# Patient Record
Sex: FEMALE | Race: WHITE | NOT HISPANIC OR LATINO | Employment: OTHER | URBAN - METROPOLITAN AREA
[De-identification: names, ages, dates, MRNs, and addresses within clinical notes are randomized per-mention and may not be internally consistent; named-entity substitution may affect disease eponyms.]

---

## 2018-06-21 LAB
ABSOL LYMPHOCYTES (HISTORICAL): 1.8 K/UL (ref 0.5–4)
ALBUMIN SERPL BCP-MCNC: 3.7 G/DL (ref 3–5.2)
ALP SERPL-CCNC: 142 U/L (ref 43–122)
ALT SERPL W P-5'-P-CCNC: 19 U/L (ref 9–52)
ANION GAP SERPL CALCULATED.3IONS-SCNC: 11 MMOL/L (ref 5–14)
AST SERPL W P-5'-P-CCNC: 26 U/L (ref 14–36)
BASOPHILS # BLD AUTO: 0 % (ref 0–1)
BASOPHILS # BLD AUTO: 0 K/UL (ref 0–0.1)
BILIRUB SERPL-MCNC: 0.3 MG/DL
BUN SERPL-MCNC: 22 MG/DL (ref 5–25)
CALCIUM SERPL-MCNC: 9.4 MG/DL (ref 8.4–10.2)
CHLORIDE SERPL-SCNC: 103 MEQ/L (ref 97–108)
CO2 SERPL-SCNC: 28 MMOL/L (ref 22–30)
CREATINE, SERUM (HISTORICAL): 0.56 MG/DL (ref 0.6–1.2)
DEPRECATED RDW RBC AUTO: 15.3 %
EGFR (HISTORICAL): >60 ML/MIN/1.73 M2
EOSINOPHIL # BLD AUTO: 0.1 K/UL (ref 0–0.4)
EOSINOPHIL NFR BLD AUTO: 2 % (ref 0–6)
GLUCOSE SERPL-MCNC: 120 MG/DL (ref 70–99)
HCT VFR BLD AUTO: 40.2 % (ref 36–46)
HGB BLD-MCNC: 13 G/DL (ref 12–16)
LYMPHOCYTES NFR BLD AUTO: 22 % (ref 25–45)
MCH RBC QN AUTO: 30.7 PG (ref 26–34)
MCHC RBC AUTO-ENTMCNC: 32.4 % (ref 31–36)
MCV RBC AUTO: 95 FL (ref 80–100)
MONOCYTES # BLD AUTO: 0.8 K/UL (ref 0.2–0.9)
MONOCYTES NFR BLD AUTO: 9 % (ref 1–10)
NEUTROPHILS ABS COUNT (HISTORICAL): 5.5 K/UL (ref 1.8–7.8)
NEUTS SEG NFR BLD AUTO: 67 % (ref 45–65)
PLATELET # BLD AUTO: 327 K/MCL (ref 150–450)
POTASSIUM SERPL-SCNC: 4.8 MEQ/L (ref 3.6–5)
RBC # BLD AUTO: 4.25 M/MCL (ref 4–5.2)
SODIUM SERPL-SCNC: 142 MEQ/L (ref 137–147)
TOTAL PROTEIN (HISTORICAL): 7.2 G/DL (ref 5.9–8.4)
WBC # BLD AUTO: 8.3 K/MCL (ref 4.5–11)

## 2018-08-05 DIAGNOSIS — I10 ESSENTIAL HYPERTENSION: Primary | ICD-10-CM

## 2018-08-05 RX ORDER — AMLODIPINE BESYLATE 5 MG/1
TABLET ORAL
Qty: 90 TABLET | Refills: 3 | Status: SHIPPED | OUTPATIENT
Start: 2018-08-05 | End: 2019-01-23 | Stop reason: SDUPTHER

## 2018-08-24 ENCOUNTER — HOSPITAL ENCOUNTER (EMERGENCY)
Facility: HOSPITAL | Age: 83
Discharge: HOME | End: 2018-08-24
Attending: EMERGENCY MEDICINE | Admitting: EMERGENCY MEDICINE
Payer: MEDICARE

## 2018-08-24 ENCOUNTER — APPOINTMENT (EMERGENCY)
Dept: RADIOLOGY | Facility: HOSPITAL | Age: 83
End: 2018-08-24
Payer: MEDICARE

## 2018-08-24 VITALS
HEART RATE: 90 BPM | SYSTOLIC BLOOD PRESSURE: 124 MMHG | TEMPERATURE: 98.2 F | DIASTOLIC BLOOD PRESSURE: 58 MMHG | BODY MASS INDEX: 22.98 KG/M2 | RESPIRATION RATE: 18 BRPM | WEIGHT: 114 LBS | HEIGHT: 59 IN | OXYGEN SATURATION: 98 %

## 2018-08-24 DIAGNOSIS — S01.01XA SCALP LACERATION, INITIAL ENCOUNTER: ICD-10-CM

## 2018-08-24 DIAGNOSIS — S09.90XA MINOR HEAD INJURY WITHOUT LOSS OF CONSCIOUSNESS, INITIAL ENCOUNTER: Primary | ICD-10-CM

## 2018-08-24 PROCEDURE — 72125 CT NECK SPINE W/O DYE: CPT

## 2018-08-24 PROCEDURE — 70450 CT HEAD/BRAIN W/O DYE: CPT

## 2018-08-24 PROCEDURE — 99284 EMERGENCY DEPT VISIT MOD MDM: CPT

## 2018-08-24 RX ORDER — ASPIRIN 81 MG/1
81 TABLET, CHEWABLE ORAL DAILY
COMMUNITY
End: 2019-01-23 | Stop reason: SDUPTHER

## 2018-08-24 NOTE — ED ATTENDING ATTESTATION
Brittany Weir DO, saw and evaluated the patient  I have discussed the patient with the resident/non-physician practitioner and agree with the resident's/non-physician practitioner's findings, Plan of Care, and MDM as documented in the resident's/non-physician practitioner's note, except where noted  All available labs and Radiology studies were reviewed  At this point I agree with the current assessment done in the Emergency Department  I have conducted an independent evaluation of this patient a history and physical is as follows:    80 yof on asa with mechanical fall  Reaching for clothing when she fell onto posterior head  No pain  No LOC  No a/e factors  Feels well now  Small laceration to posterior scalp  A/P: mechanical fall  Ct head/necl  Laceration repair      Critical Care Time  CritCare Time    Procedures
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
## 2018-08-24 NOTE — DISCHARGE INSTRUCTIONS
To not scrub the head for 24 hours after staple application  Take Tylenol as needed for pain  Return to your primary care provider or the emergency department in 7 days to get the staples removed  Return to the emergency department for the following, but not limited to change in mental status, changes in vision, headache, neck pain, fevers, chills, pus or drainage from the wound site, redness surrounding the area  Head Injury   WHAT YOU NEED TO KNOW:   A head injury is most often caused by a blow to the head  This may occur from a fall, bicycle injury, sports injury, being struck in the head, or a motor vehicle accident  DISCHARGE INSTRUCTIONS:   Call 911 or have someone else call for any of the following:   · You cannot be woken  · You have a seizure  · You stop responding to others or you faint  · You have blurry or double vision  · Your speech becomes slurred or confused  · You have arm or leg weakness, loss of feeling, or new problems with coordination  · Your pupils are larger than usual or one pupil is a different size than the other  · You have blood or clear fluid coming out of your ears or nose  Return to the emergency department if:   · You have repeated or forceful vomiting  · You feel confused  · Your headache gets worse or becomes severe  · You or someone caring for you notices that you are harder to wake than usual   Contact your healthcare provider if:   · Your symptoms last longer than 6 weeks after the injury  · You have questions or concerns about your condition or care  Medicines:   · Acetaminophen  decreases pain  Acetaminophen is available without a doctor's order  Ask how much to take and how often to take it  Follow directions  Acetaminophen can cause liver damage if not taken correctly  · Take your medicine as directed  Contact your healthcare provider if you think your medicine is not helping or if you have side effects   Tell him or her if you are allergic to any medicine  Keep a list of the medicines, vitamins, and herbs you take  Include the amounts, and when and why you take them  Bring the list or the pill bottles to follow-up visits  Carry your medicine list with you in case of an emergency  Self-care:   · Rest  or do quiet activities for 24 to 48 hours  Limit your time watching TV, using the computer, or doing tasks that require a lot of thinking  Slowly return to your normal activities as directed  Do not play sports or do activities that may cause you to get hit in the head  Ask your healthcare provider when you can return to sports  · Apply ice  on your head for 15 to 20 minutes every hour or as directed  Use an ice pack, or put crushed ice in a plastic bag  Cover it with a towel before you apply it to your skin  Ice helps prevent tissue damage and decreases swelling and pain  · Have someone stay  ·   ·  with you for 24 hours  or as directed  This person can monitor you for complications and call [phone]  When you are awake the person should ask you a few questions to see if you are thinking clearly  An example would be to ask your name or your address  Prevent another head injury:   · Wear a helmet that fits properly  Do this when you play sports, or ride a bike, scooter, or skateboard  Helmets help decrease your risk of a serious head injury  Talk to your healthcare provider about other ways you can protect yourself if you play sports  · Wear your seat belt every time you are in a car  This helps to decrease your risk for a head injury if you are in a car accident  Follow up with your healthcare provider as directed:  Write down your questions so you remember to ask them during your visits  © 2017 [address] Information is for End User's use only and may not be sold, redistributed or otherwise used for commercial purposes   All illustrations and images included in CareNotes® are the copyrighted property of [employer]  or William Zambrano  The above information is an  only  It is not intended as medical advice for individual conditions or treatments  Talk to your doctor, nurse or pharmacist before following any medical regimen to see if it is safe and effective for you  Laceration   WHAT YOU NEED TO KNOW:   A laceration is an injury to the skin and the soft tissue underneath it  Lacerations happen when you are cut or hit by something  They can happen anywhere on the body  DISCHARGE INSTRUCTIONS:   Return to the emergency department if:   · You have heavy bleeding or bleeding that does not stop after 10 minutes of holding firm, direct pressure over the wound  · Your wound opens up  Contact your healthcare provider if:   · You have a fever or chills  · Your laceration is red, warm, or swollen  · You have red streaks on your skin coming from your wound  · You have white or yellow drainage from the wound that smells bad  · You have pain that gets worse, even after treatment  · You have questions or concerns about your condition or care  Medicines:   · Prescription pain medicine  may be given  Ask how to take this medicine safely  · Antibiotics  help treat or prevent a bacterial infection  · Take your medicine as directed  Contact your healthcare provider if you think your medicine is not helping or if you have side effects  Tell him or her if you are allergic to any medicine  Keep a list of the medicines, vitamins, and herbs you take  Include the amounts, and when and why you take them  Bring the list or the pill bottles to follow-up visits  Carry your medicine list with you in case of an emergency  Care for your wound as directed:   · Do not get your wound wet  until your healthcare provider says it is okay  Do not soak your wound in water  Do not go swimming until your healthcare provider says it is okay  Carefully wash the wound with soap and water  Gently pat the area dry or allow it to air dry  · Change your bandages  when they get wet, dirty, or after washing  Apply new, clean bandages as directed  Do not apply elastic bandages or tape too tight  Do not put powders or lotions over your incision  · Apply antibiotic ointment as directed  Your healthcare provider may give you antibiotic ointment to put over your wound if you have stitches  If you have strips of tape over your incision, let them dry up and fall off on their own  If they do not fall off within 14 days, gently remove them  If you have glue over your wound, do not remove or pick at it  If your glue comes off, do not replace it with glue that you have at home  · Check your wound every day for signs of infection such as swelling, redness, or pus  Self-care:   · Apply ice  on your wound for 15 to 20 minutes every hour or as directed  Use an ice pack, or put crushed ice in a plastic bag  Cover it with a towel  Ice helps prevent tissue damage and decreases swelling and pain  · Use a splint as directed  A splint will decrease movement and stress on your wound  It may help it heal faster  A splint may be used for lacerations over joints or areas of your body that bend  Ask your healthcare provider how to apply and remove a splint  · Decrease scarring of your wound  by applying ointments as directed  Do not apply ointments until your healthcare provider says it is okay  You may need to wait until your wound is healed  Ask which ointment to buy and how often to use it  After your wound is healed, use sunscreen over the area when you are out in the sun  You should do this for at least 6 months to 1 year after your injury  Follow up with your healthcare provider as directed: You may need to follow up in 24 to 48 hours to have your wound checked for infection  You will need to return in 3 to 14 days if you have stitches or staples so they can be removed   Care for your wound as directed to prevent infection and help it heal  Write down your questions so you remember to ask them during your visits  © 2017 [address] Information is for End User's use only and may not be sold, redistributed or otherwise used for commercial purposes  All illustrations and images included in CareNotes® are the copyrighted property of A D A M , Inc  or William Zambrano  The above information is an  only  It is not intended as medical advice for individual conditions or treatments  Talk to your doctor, nurse or pharmacist before following any medical regimen to see if it is safe and effective for you

## 2018-08-30 RX ORDER — ACETAMINOPHEN 500 MG
TABLET ORAL EVERY 6 HOURS
COMMUNITY
End: 2019-03-27 | Stop reason: SDUPTHER

## 2018-08-30 RX ORDER — AMLODIPINE BESYLATE 5 MG/1
TABLET ORAL EVERY 24 HOURS
COMMUNITY
Start: 2017-07-27 | End: 2019-03-05

## 2018-08-31 ENCOUNTER — OFFICE VISIT (OUTPATIENT)
Dept: FAMILY MEDICINE CLINIC | Facility: CLINIC | Age: 83
End: 2018-08-31
Payer: MEDICARE

## 2018-08-31 VITALS — RESPIRATION RATE: 22 BRPM | SYSTOLIC BLOOD PRESSURE: 130 MMHG | DIASTOLIC BLOOD PRESSURE: 80 MMHG | HEART RATE: 80 BPM

## 2018-08-31 DIAGNOSIS — Z48.02 REMOVAL OF STAPLE: Primary | ICD-10-CM

## 2018-08-31 DIAGNOSIS — R05.9 COUGH: ICD-10-CM

## 2018-08-31 PROBLEM — S32.599A PUBIC RAMUS FRACTURE (HCC): Status: ACTIVE | Noted: 2018-03-20

## 2018-08-31 PROCEDURE — 99214 OFFICE O/P EST MOD 30 MIN: CPT | Performed by: NURSE PRACTITIONER

## 2018-08-31 NOTE — PROGRESS NOTES
Assessment/Plan:    No problem-specific Assessment & Plan notes found for this encounter  Diagnoses and all orders for this visit:    Removal of staple    Cough    Other orders  -     acetaminophen (TYLENOL) 500 mg tablet; every 6 (six) hours  -     amLODIPine (NORVASC) 5 mg tablet; every 24 hours          Subjective:      Patient ID: Cande Alvarez is a 80 y o  female  HPI    Renata Ludivina is here today for staple removal   She fell a week ago  She does have a laceration which was repaired at the ER  She has 6 staples in place on the back of her head  She is having some balance issues  She was in PT but had to be stopped due to insurance reasons  But now at this point she may be able to resume  On her gait is unsteady she does use a walker  She also has developed a slight cough and some chest congestion  No fevers or chills  Eating normal     Daughter questioning if taking the amilodipine at night, supper time, I see no problem with that  The following portions of the patient's history were reviewed and updated as appropriate: allergies, current medications, past family history, past medical history, past social history, past surgical history and problem list     Review of Systems   Constitutional: Negative for activity change, appetite change, fatigue and fever  HENT: Negative for congestion, mouth sores, nosebleeds, sneezing and sore throat  Respiratory: Positive for cough  Negative for shortness of breath and wheezing  Cardiovascular: Negative for chest pain  Skin: Positive for wound  Objective:  Vitals:    08/31/18 1002   BP: 130/80   BP Location: Left arm   Patient Position: Sitting   Cuff Size: Standard   Pulse: 80   Resp: 22      Physical Exam   Constitutional: She appears well-developed and well-nourished  Pulmonary/Chest: Effort normal and breath sounds normal  She has no wheezes  She has no rales  She exhibits no tenderness     Skin:

## 2018-12-27 ENCOUNTER — TELEPHONE (OUTPATIENT)
Dept: FAMILY MEDICINE CLINIC | Facility: CLINIC | Age: 83
End: 2018-12-27

## 2018-12-27 NOTE — TELEPHONE ENCOUNTER
Patient daughter called she said to cancel her appointment on 12/28/18 at 2 pm with dr Patricia Long asked if she would like to reschedule another appointment at this time she said that she will call at a later time to make another appointment

## 2019-01-23 ENCOUNTER — TELEPHONE (OUTPATIENT)
Dept: FAMILY MEDICINE CLINIC | Facility: CLINIC | Age: 84
End: 2019-01-23

## 2019-01-23 DIAGNOSIS — I10 ESSENTIAL HYPERTENSION: ICD-10-CM

## 2019-01-23 RX ORDER — LEVETIRACETAM 100 MG/ML
10 SOLUTION ORAL 2 TIMES DAILY
Qty: 473 ML | Refills: 1 | Status: SHIPPED | OUTPATIENT
Start: 2019-01-23 | End: 2019-06-05 | Stop reason: SDUPTHER

## 2019-01-23 RX ORDER — AMLODIPINE BESYLATE 5 MG/1
5 TABLET ORAL DAILY
Qty: 90 TABLET | Refills: 3 | Status: SHIPPED | OUTPATIENT
Start: 2019-01-23 | End: 2020-01-03

## 2019-01-23 RX ORDER — CLOTRIMAZOLE 10 MG/1
10 LOZENGE ORAL; TOPICAL
Qty: 70 TABLET | Refills: 0 | Status: SHIPPED | OUTPATIENT
Start: 2019-01-23 | End: 2019-04-08

## 2019-01-23 RX ORDER — ASPIRIN 81 MG/1
81 TABLET, CHEWABLE ORAL DAILY
Qty: 90 TABLET | Refills: 3 | Status: CANCELLED | OUTPATIENT
Start: 2019-01-23

## 2019-01-23 RX ORDER — CLOTRIMAZOLE 10 MG/1
10 LOZENGE ORAL; TOPICAL 3 TIMES DAILY
Qty: 90 TABLET | Refills: 3 | Status: CANCELLED | OUTPATIENT
Start: 2019-01-23

## 2019-01-23 RX ORDER — ASPIRIN 81 MG/1
81 TABLET, CHEWABLE ORAL DAILY
Qty: 100 TABLET | Refills: 3 | Status: SHIPPED | OUTPATIENT
Start: 2019-01-23

## 2019-01-23 RX ORDER — POLYETHYLENE GLYCOL 3350 17 G/17G
17 POWDER, FOR SOLUTION ORAL DAILY
Qty: 527 G | Refills: 1 | Status: CANCELLED | OUTPATIENT
Start: 2019-01-23

## 2019-01-23 RX ORDER — INFLUENZA A VIRUSA/MICHIGAN/45/2015 X-275 (H1N1) ANTIGEN (FORMALDEHYDE INACTIVATED), INFLUENZA A VIRUS A/HONG KONG/4801/2014 X-263B (H3N2) ANTIGEN (FORMALDEHYDE INACTIVATED), AND INFLUENZA B VIRUS B/BRISBANE/60/2008 ANTIGEN (FORMALDEHYDE INACTIVATED) 60; 60; 60 UG/.5ML; UG/.5ML; UG/.5ML
INJECTION, SUSPENSION INTRAMUSCULAR
Refills: 0 | COMMUNITY
Start: 2018-10-24 | End: 2019-04-08

## 2019-01-23 RX ORDER — ELECTROLYTES/DEXTROSE
1 SOLUTION, ORAL ORAL EVERY 24 HOURS
Qty: 100 TABLET | Refills: 3 | Status: SHIPPED | OUTPATIENT
Start: 2019-01-23

## 2019-01-23 RX ORDER — LEVETIRACETAM 250 MG/1
250 TABLET ORAL 2 TIMES DAILY
Qty: 90 TABLET | Refills: 3 | Status: CANCELLED | OUTPATIENT
Start: 2019-01-23

## 2019-01-23 RX ORDER — ASCORBIC ACID 500 MG
500 TABLET ORAL DAILY
Qty: 90 TABLET | Refills: 3 | Status: CANCELLED | OUTPATIENT
Start: 2019-01-23

## 2019-01-23 RX ORDER — AMLODIPINE BESYLATE 5 MG/1
10 TABLET ORAL DAILY
Qty: 90 TABLET | Refills: 3 | Status: CANCELLED | OUTPATIENT
Start: 2019-01-23

## 2019-01-23 NOTE — TELEPHONE ENCOUNTER
Can you please refill patient medication she is in a new nursing home they also need some dx code thank you

## 2019-01-23 NOTE — TELEPHONE ENCOUNTER
Yogesh Mike from [facility] called pt needs RX for Amlodipine 10 mg and RX for ASA 81 mg and RX for Clotrimazole 10 mg  And RX for Vitamin C 500 mg and Rx for  Levetiraeetam ER 25 mg and Rx for Miralax powder  Called into [facility] at [address]

## 2019-01-23 NOTE — TELEPHONE ENCOUNTER
Faxed received regarding the following medications that need to be refilled  Pt is new at the facility and facility needs the meds  These medications arent even on med list okay to fill? no fever and no chills.

## 2019-01-24 ENCOUNTER — TELEPHONE (OUTPATIENT)
Dept: FAMILY MEDICINE CLINIC | Facility: CLINIC | Age: 84
End: 2019-01-24

## 2019-01-24 DIAGNOSIS — I10 ESSENTIAL HYPERTENSION: ICD-10-CM

## 2019-01-24 RX ORDER — ASCORBIC ACID 500 MG
500 TABLET ORAL DAILY
Qty: 90 TABLET | Refills: 3 | Status: CANCELLED | OUTPATIENT
Start: 2019-01-24

## 2019-01-24 RX ORDER — ASPIRIN 81 MG/1
81 TABLET, CHEWABLE ORAL DAILY
Qty: 100 TABLET | Refills: 3 | Status: CANCELLED | OUTPATIENT
Start: 2019-01-24

## 2019-01-24 RX ORDER — LEVETIRACETAM 100 MG/ML
10 SOLUTION ORAL 2 TIMES DAILY
Qty: 473 ML | Refills: 1 | Status: CANCELLED | OUTPATIENT
Start: 2019-01-24

## 2019-01-24 RX ORDER — CLOTRIMAZOLE 10 MG/1
10 LOZENGE ORAL; TOPICAL
Qty: 70 TABLET | Refills: 0 | Status: CANCELLED | OUTPATIENT
Start: 2019-01-24

## 2019-01-24 RX ORDER — AMLODIPINE BESYLATE 5 MG/1
5 TABLET ORAL DAILY
Qty: 90 TABLET | Refills: 3 | Status: CANCELLED | OUTPATIENT
Start: 2019-01-24

## 2019-01-24 NOTE — TELEPHONE ENCOUNTER
Patient daughter called she said to cancel the appointment on 2/7/19 at 130 pm with dr Bushra Stoll that she will be staying at [facility] and will be going to [facility] to be seen since it will be closer for them so the appointment was cancelled per daughter request

## 2019-01-25 ENCOUNTER — TELEPHONE (OUTPATIENT)
Dept: FAMILY MEDICINE CLINIC | Facility: CLINIC | Age: 84
End: 2019-01-25

## 2019-01-25 NOTE — TELEPHONE ENCOUNTER
I called daughter above message below  Daughter states she is confuse as to why is patient still on the medication  Clotrimazole 10 mg? She also wants to know why is it 5 times a day?  Please advise

## 2019-01-25 NOTE — TELEPHONE ENCOUNTER
Patient daughter Lisa Rodas called she is questioning the Rx for Clotrimazole 10 mg that a Rx was called in for this and it says that she is supposed to take 1 tablet for 5 x a day daughter wants to know why Lisa Rodas can be reached at [phone]   TY

## 2019-01-28 NOTE — TELEPHONE ENCOUNTER
Daughter Anjali Licea concerned because pt has been using the mouth wash for 3 1/2 weeks and now on the medication   Has concerns about the length of time

## 2019-02-06 ENCOUNTER — TELEPHONE (OUTPATIENT)
Dept: FAMILY MEDICINE CLINIC | Facility: CLINIC | Age: 84
End: 2019-02-06
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
## 2019-02-25 ENCOUNTER — TELEPHONE (OUTPATIENT)
Dept: FAMILY MEDICINE CLINIC | Facility: CLINIC | Age: 84
End: 2019-02-25

## 2019-02-25 NOTE — TELEPHONE ENCOUNTER
dain from [facility] called she would a order to get  A stool sample to rule out C-diff on patient Alfredo Rizzo said that she is having diarrhea lose stools and has a very bad smell  Fax to [phone] # [phone]  Pt of dr Troy Slaughter

## 2019-03-01 RX ORDER — DOCUSATE SODIUM 100 MG/1
100 CAPSULE, LIQUID FILLED ORAL
COMMUNITY

## 2019-03-05 ENCOUNTER — OFFICE VISIT (OUTPATIENT)
Dept: FAMILY MEDICINE CLINIC | Facility: CLINIC | Age: 84
End: 2019-03-05
Payer: MEDICARE

## 2019-03-05 VITALS
OXYGEN SATURATION: 95 % | HEART RATE: 74 BPM | RESPIRATION RATE: 16 BRPM | BODY MASS INDEX: 23.79 KG/M2 | HEIGHT: 59 IN | WEIGHT: 118 LBS | SYSTOLIC BLOOD PRESSURE: 126 MMHG | TEMPERATURE: 98.6 F | DIASTOLIC BLOOD PRESSURE: 70 MMHG

## 2019-03-05 DIAGNOSIS — R26.2 AMBULATORY DYSFUNCTION: ICD-10-CM

## 2019-03-05 DIAGNOSIS — H61.22 IMPACTED CERUMEN OF LEFT EAR: ICD-10-CM

## 2019-03-05 DIAGNOSIS — R56.9 SEIZURE (HCC): ICD-10-CM

## 2019-03-05 DIAGNOSIS — R13.10 SWALLOWING DYSFUNCTION: ICD-10-CM

## 2019-03-05 DIAGNOSIS — C18.9 MALIGNANT NEOPLASM OF COLON, UNSPECIFIED PART OF COLON (HCC): ICD-10-CM

## 2019-03-05 DIAGNOSIS — I10 ESSENTIAL HYPERTENSION: ICD-10-CM

## 2019-03-05 DIAGNOSIS — I67.82 TEMPORARY CEREBRAL VASCULAR DYSFUNCTION: Primary | ICD-10-CM

## 2019-03-05 PROBLEM — I16.1 HYPERTENSIVE EMERGENCY: Status: ACTIVE | Noted: 2018-12-09

## 2019-03-05 PROCEDURE — 99214 OFFICE O/P EST MOD 30 MIN: CPT | Performed by: FAMILY MEDICINE

## 2019-03-05 PROCEDURE — 69210 REMOVE IMPACTED EAR WAX UNI: CPT | Performed by: FAMILY MEDICINE

## 2019-03-05 RX ORDER — BISACODYL 10 MG
10 SUPPOSITORY, RECTAL RECTAL DAILY PRN
COMMUNITY

## 2019-03-05 RX ORDER — ASCORBIC ACID 500 MG
500 TABLET ORAL DAILY
COMMUNITY
End: 2020-01-03

## 2019-03-05 RX ORDER — SENNA PLUS 8.6 MG/1
1 TABLET ORAL DAILY PRN
COMMUNITY

## 2019-03-05 NOTE — PROGRESS NOTES
Assessment/Plan:     Diagnoses and all orders for this visit:    Temporary cerebral vascular dysfunction  Comments:  Symptoms improved  Continue to follow up with neurologist     Essential hypertension  Comments:  Stable  Continue same  Orders:  -     CBC and differential; Future  -     Comprehensive metabolic panel; Future  -     TSH, 3rd generation with Free T4 reflex; Future    Seizure ([facility] )  Comments:  Stable on meds  She is to continue to follow up with neurologist     Ambulatory dysfunction  Comments: It was discussed about fall risk  Continue to use walker  Discussed about physical therapy  Impacted cerumen of left ear  Comments:  Removed using curette    Malignant neoplasm of colon, unspecified part of colon ([facility] )  Comments: We will continue to monitor    Swallowing dysfunction  Comments:  Continue with pureed food    Other orders  -     docusate sodium (COLACE) 100 mg capsule; Take 100 mg by mouth  -     POLYETHYLENE GLYCOL 3350 PO; Take 1 g by mouth daily  -     Magnesium Hydroxide (MILK OF MAGNESIA PO); Take 30 mL by mouth daily as needed  -     ascorbic acid (VITAMIN C) 500 mg tablet; Take 500 mg by mouth daily  -     bisacodyl (BISCOLAX) 10 mg suppository; Insert 10 mg into the rectum daily as needed for constipation  -     senna (SENOKOT) 8 6 MG tablet; Take 1 tablet by mouth daily as needed for constipation  -     Starch-Maltodextrin (THICK-IT PO); Take by mouth  -     Sodium Phosphates (ENEMA READY-TO-USE RE); Insert into the rectum daily as needed  -     Ear cerumen removal          There are no Patient Instructions on file for this visit  Return in about 1 month (around 4/5/2019)  Subjective:      Patient ID: Lehman Fothergill is a 80 y o  female  Chief Complaint   Patient presents with    Hypertension       She is here today as a new patient to establish and for multiple medical problems  She was hospitalized recently status post CVA episode  Her symptoms improved    She continues to have problems with swallowing  She has been able to eat pureed food and adding Thick-it to it  The following portions of the patient's history were reviewed and updated as appropriate: allergies, current medications, past family history, past medical history, past social history, past surgical history and problem list     Review of Systems   Constitutional: Negative for chills and fever  HENT: Negative for trouble swallowing  Eyes: Negative for visual disturbance  Respiratory: Negative for cough and shortness of breath  Cardiovascular: Negative for chest pain, palpitations and leg swelling  Gastrointestinal: Negative for abdominal pain, constipation and diarrhea  Endocrine: Negative for cold intolerance and heat intolerance  Genitourinary: Negative for difficulty urinating and dysuria  Musculoskeletal: Negative for gait problem  Skin: Negative for rash  Neurological: Positive for weakness  Negative for dizziness, tremors, seizures and headaches  Hematological: Negative for adenopathy  Psychiatric/Behavioral: Negative for behavioral problems           Current Outpatient Medications   Medication Sig Dispense Refill    amLODIPine (NORVASC) 5 mg tablet Take 1 tablet (5 mg total) by mouth daily 90 tablet 3    ascorbic acid (VITAMIN C) 500 mg tablet Take 500 mg by mouth daily      aspirin 81 mg chewable tablet Chew 1 tablet (81 mg total) daily 100 tablet 3    bisacodyl (BISCOLAX) 10 mg suppository Insert 10 mg into the rectum daily as needed for constipation      Calcium Carbonate-Vitamin D (CALTRATE 600+D) 600-400 MG-UNIT per tablet Take 1 tablet by mouth daily 100 tablet 3    levETIRAcetam (KEPPRA) 100 mg/mL oral solution Take 5 2 mL (520 mg total) by mouth 2 (two) times a day 473 mL 1    Magnesium Hydroxide (MILK OF MAGNESIA PO) Take 30 mL by mouth daily as needed      Multiple Vitamins-Minerals (MULTIVITAMIN ADULT) TABS Take 1 tablet by mouth every 24 hours 100 tablet 3    POLYETHYLENE GLYCOL 3350 PO Take 1 g by mouth daily      senna (SENOKOT) 8 6 MG tablet Take 1 tablet by mouth daily as needed for constipation      Sodium Phosphates (ENEMA READY-TO-USE RE) Insert into the rectum daily as needed      Starch-Maltodextrin (THICK-IT PO) Take by mouth      acetaminophen (TYLENOL) 500 mg tablet every 6 (six) hours      clotrimazole (MYCELEX) 10 mg elenita Take 1 tablet (10 mg total) by mouth 5 (five) times a day (Patient not taking: Reported on 3/5/2019) 70 tablet 0    docusate sodium (COLACE) 100 mg capsule Take 100 mg by mouth      FLUZONE HIGH-DOSE 0 5 ML MELECIO TO BE ADMINISTERED BY PHARMACIST FOR IMMUNIZATION  0     No current facility-administered medications for this visit  Objective:    /70 (BP Location: Left arm, Patient Position: Sitting, Cuff Size: Standard)   Pulse 74   Temp 98 6 °F (37 °C) (Tympanic)   Resp 16   Ht 4' 11" (1 499 m)   Wt 53 5 kg (118 lb)   SpO2 95%   BMI 23 83 kg/m²          Physical Exam   Constitutional: She appears well-developed and well-nourished  HENT:   Head: Normocephalic and atraumatic  Ears:    Eyes: Pupils are equal, round, and reactive to light  EOM are normal    Neck: Normal range of motion  Neck supple  Cardiovascular: Normal rate, regular rhythm and normal heart sounds  Pulmonary/Chest: Effort normal and breath sounds normal    Abdominal: Soft  Bowel sounds are normal    Musculoskeletal: Normal range of motion  She exhibits no edema  Lymphadenopathy:     She has no cervical adenopathy  Neurological: She is alert  No cranial nerve deficit  Skin: Skin is warm  Psychiatric: She has a normal mood and affect  Nursing note and vitals reviewed  Ear cerumen removal  Date/Time: 3/5/2019 1:11 PM  Performed by: Catrachita Hale MD  Authorized by:  Catrachita Hale MD     Patient location:  Clinic  Other Assisting Provider: No    Consent:     Consent obtained:  Verbal    Consent given by: Patient    Risks discussed:  Bleeding and dizziness    Alternatives discussed:  No treatment  Universal protocol:     Procedure explained and questions answered to patient or proxy's satisfaction: yes      Patient identity confirmed:  Verbally with patient  Procedure details:     Local anesthetic:  None    Location:  L ear    Procedure type: curette      Approach:  External  Post-procedure details:     Complication:  None    Hearing quality:  Improved    Patient tolerance of procedure:   Tolerated well, no immediate complications           Alex Abernathy MD

## 2019-03-07 ENCOUNTER — APPOINTMENT (OUTPATIENT)
Dept: LAB | Facility: IMAGING CENTER | Age: 84
End: 2019-03-07
Payer: MEDICARE

## 2019-03-07 DIAGNOSIS — I10 ESSENTIAL HYPERTENSION: ICD-10-CM

## 2019-03-07 LAB
ALBUMIN SERPL BCP-MCNC: 2.7 G/DL (ref 3.5–5)
ALP SERPL-CCNC: 104 U/L (ref 46–116)
ALT SERPL W P-5'-P-CCNC: 19 U/L (ref 12–78)
ANION GAP SERPL CALCULATED.3IONS-SCNC: 6 MMOL/L (ref 4–13)
AST SERPL W P-5'-P-CCNC: 20 U/L (ref 5–45)
BASOPHILS # BLD AUTO: 0.07 THOUSANDS/ΜL (ref 0–0.1)
BASOPHILS NFR BLD AUTO: 1 % (ref 0–1)
BILIRUB SERPL-MCNC: 0.38 MG/DL (ref 0.2–1)
BUN SERPL-MCNC: 21 MG/DL (ref 5–25)
CALCIUM SERPL-MCNC: 8.2 MG/DL (ref 8.3–10.1)
CHLORIDE SERPL-SCNC: 107 MMOL/L (ref 100–108)
CO2 SERPL-SCNC: 30 MMOL/L (ref 21–32)
CREAT SERPL-MCNC: 0.54 MG/DL (ref 0.6–1.3)
EOSINOPHIL # BLD AUTO: 0.43 THOUSAND/ΜL (ref 0–0.61)
EOSINOPHIL NFR BLD AUTO: 6 % (ref 0–6)
ERYTHROCYTE [DISTWIDTH] IN BLOOD BY AUTOMATED COUNT: 15.1 % (ref 11.6–15.1)
GFR SERPL CREATININE-BSD FRML MDRD: 82 ML/MIN/1.73SQ M
GLUCOSE P FAST SERPL-MCNC: 89 MG/DL (ref 65–99)
HCT VFR BLD AUTO: 37.2 % (ref 34.8–46.1)
HGB BLD-MCNC: 11.4 G/DL (ref 11.5–15.4)
IMM GRANULOCYTES # BLD AUTO: 0.02 THOUSAND/UL (ref 0–0.2)
IMM GRANULOCYTES NFR BLD AUTO: 0 % (ref 0–2)
LYMPHOCYTES # BLD AUTO: 1.66 THOUSANDS/ΜL (ref 0.6–4.47)
LYMPHOCYTES NFR BLD AUTO: 24 % (ref 14–44)
MCH RBC QN AUTO: 29.5 PG (ref 26.8–34.3)
MCHC RBC AUTO-ENTMCNC: 30.6 G/DL (ref 31.4–37.4)
MCV RBC AUTO: 96 FL (ref 82–98)
MONOCYTES # BLD AUTO: 0.94 THOUSAND/ΜL (ref 0.17–1.22)
MONOCYTES NFR BLD AUTO: 13 % (ref 4–12)
NEUTROPHILS # BLD AUTO: 3.93 THOUSANDS/ΜL (ref 1.85–7.62)
NEUTS SEG NFR BLD AUTO: 56 % (ref 43–75)
NRBC BLD AUTO-RTO: 0 /100 WBCS
PLATELET # BLD AUTO: 198 THOUSANDS/UL (ref 149–390)
PMV BLD AUTO: 12.7 FL (ref 8.9–12.7)
POTASSIUM SERPL-SCNC: 4.7 MMOL/L (ref 3.5–5.3)
PROT SERPL-MCNC: 6.1 G/DL (ref 6.4–8.2)
RBC # BLD AUTO: 3.86 MILLION/UL (ref 3.81–5.12)
SODIUM SERPL-SCNC: 143 MMOL/L (ref 136–145)
TSH SERPL DL<=0.05 MIU/L-ACNC: 3.52 UIU/ML (ref 0.36–3.74)
WBC # BLD AUTO: 7.05 THOUSAND/UL (ref 4.31–10.16)

## 2019-03-07 PROCEDURE — 80053 COMPREHEN METABOLIC PANEL: CPT

## 2019-03-07 PROCEDURE — 84443 ASSAY THYROID STIM HORMONE: CPT

## 2019-03-07 PROCEDURE — 85025 COMPLETE CBC W/AUTO DIFF WBC: CPT

## 2019-03-07 PROCEDURE — 36415 COLL VENOUS BLD VENIPUNCTURE: CPT

## 2019-03-15 ENCOUNTER — TELEPHONE (OUTPATIENT)
Dept: FAMILY MEDICINE CLINIC | Facility: CLINIC | Age: 84
End: 2019-03-15

## 2019-03-15 NOTE — TELEPHONE ENCOUNTER
Received fax from [facility] pt on miralax at 8 am  Requesting order incase of loose stools  Faxed order to hold miralax for loose stools

## 2019-03-18 ENCOUNTER — TELEPHONE (OUTPATIENT)
Dept: FAMILY MEDICINE CLINIC | Facility: CLINIC | Age: 84
End: 2019-03-18

## 2019-03-27 DIAGNOSIS — R52 PAIN: Primary | ICD-10-CM

## 2019-03-27 RX ORDER — ACETAMINOPHEN 500 MG
TABLET ORAL
Qty: 60 TABLET | Refills: 1 | Status: SHIPPED | OUTPATIENT
Start: 2019-03-27 | End: 2019-05-06 | Stop reason: SDUPTHER

## 2019-04-01 ENCOUNTER — TELEPHONE (OUTPATIENT)
Dept: FAMILY MEDICINE CLINIC | Facility: CLINIC | Age: 84
End: 2019-04-01

## 2019-04-02 ENCOUNTER — TELEPHONE (OUTPATIENT)
Dept: FAMILY MEDICINE CLINIC | Facility: CLINIC | Age: 84
End: 2019-04-02

## 2019-04-02 DIAGNOSIS — G47.00 INSOMNIA, UNSPECIFIED TYPE: Primary | ICD-10-CM

## 2019-04-02 RX ORDER — ZOLPIDEM TARTRATE 5 MG/1
5 TABLET ORAL
Qty: 30 TABLET | Refills: 0 | Status: SHIPPED | OUTPATIENT
Start: 2019-04-02 | End: 2019-04-03 | Stop reason: SDUPTHER

## 2019-04-03 DIAGNOSIS — G47.00 INSOMNIA, UNSPECIFIED TYPE: ICD-10-CM

## 2019-04-03 RX ORDER — ZOLPIDEM TARTRATE 5 MG/1
5 TABLET ORAL
Qty: 30 TABLET | Refills: 3 | Status: SHIPPED | OUTPATIENT
Start: 2019-04-03 | End: 2019-04-25 | Stop reason: SDUPTHER

## 2019-04-03 RX ORDER — ZOLPIDEM TARTRATE 5 MG/1
5 TABLET ORAL
Qty: 30 TABLET | Refills: 3 | Status: SHIPPED | OUTPATIENT
Start: 2019-04-03 | End: 2019-04-03 | Stop reason: SDUPTHER

## 2019-04-04 ENCOUNTER — TELEPHONE (OUTPATIENT)
Dept: FAMILY MEDICINE CLINIC | Facility: CLINIC | Age: 84
End: 2019-04-04

## 2019-04-08 ENCOUNTER — OFFICE VISIT (OUTPATIENT)
Dept: FAMILY MEDICINE CLINIC | Facility: CLINIC | Age: 84
End: 2019-04-08
Payer: MEDICARE

## 2019-04-08 VITALS
HEART RATE: 73 BPM | BODY MASS INDEX: 24.44 KG/M2 | DIASTOLIC BLOOD PRESSURE: 56 MMHG | OXYGEN SATURATION: 95 % | WEIGHT: 121.25 LBS | SYSTOLIC BLOOD PRESSURE: 122 MMHG | HEIGHT: 59 IN | TEMPERATURE: 97.8 F | RESPIRATION RATE: 16 BRPM

## 2019-04-08 DIAGNOSIS — D50.8 OTHER IRON DEFICIENCY ANEMIA: ICD-10-CM

## 2019-04-08 DIAGNOSIS — F32.89 OTHER DEPRESSION: ICD-10-CM

## 2019-04-08 DIAGNOSIS — I10 ESSENTIAL HYPERTENSION: Primary | ICD-10-CM

## 2019-04-08 PROCEDURE — 99214 OFFICE O/P EST MOD 30 MIN: CPT | Performed by: FAMILY MEDICINE

## 2019-04-08 RX ORDER — METOPROLOL SUCCINATE 25 MG/1
25 TABLET, EXTENDED RELEASE ORAL DAILY
COMMUNITY
End: 2020-01-03

## 2019-04-25 DIAGNOSIS — G47.00 INSOMNIA, UNSPECIFIED TYPE: ICD-10-CM

## 2019-04-25 RX ORDER — ZOLPIDEM TARTRATE 5 MG/1
5 TABLET ORAL
Qty: 30 TABLET | Refills: 3 | Status: SHIPPED | OUTPATIENT
Start: 2019-04-25 | End: 2020-01-03 | Stop reason: SDUPTHER

## 2019-05-01 DIAGNOSIS — F32.89 OTHER DEPRESSION: ICD-10-CM

## 2019-05-06 DIAGNOSIS — R52 PAIN: ICD-10-CM

## 2019-05-06 RX ORDER — ACETAMINOPHEN 500 MG
TABLET ORAL
Qty: 62 TABLET | Refills: 0 | Status: SHIPPED | OUTPATIENT
Start: 2019-05-06 | End: 2019-06-04 | Stop reason: SDUPTHER

## 2019-05-07 ENCOUNTER — TELEPHONE (OUTPATIENT)
Dept: FAMILY MEDICINE CLINIC | Facility: CLINIC | Age: 84
End: 2019-05-07

## 2019-05-22 ENCOUNTER — TELEPHONE (OUTPATIENT)
Dept: FAMILY MEDICINE CLINIC | Facility: CLINIC | Age: 84
End: 2019-05-22

## 2019-05-23 ENCOUNTER — TELEPHONE (OUTPATIENT)
Dept: FAMILY MEDICINE CLINIC | Facility: CLINIC | Age: 84
End: 2019-05-23

## 2019-06-04 DIAGNOSIS — R52 PAIN: ICD-10-CM

## 2019-06-04 DIAGNOSIS — I10 ESSENTIAL HYPERTENSION: ICD-10-CM

## 2019-06-04 RX ORDER — MULTIVITAMIN,THERAPEUTIC
TABLET ORAL
Qty: 30 TABLET | Refills: 12 | Status: SHIPPED | OUTPATIENT
Start: 2019-06-04 | End: 2020-01-03

## 2019-06-04 RX ORDER — ACETAMINOPHEN 500 MG
TABLET ORAL
Qty: 60 TABLET | Refills: 0 | Status: SHIPPED | OUTPATIENT
Start: 2019-06-04 | End: 2019-07-01 | Stop reason: SDUPTHER

## 2019-06-05 ENCOUNTER — TELEPHONE (OUTPATIENT)
Dept: FAMILY MEDICINE CLINIC | Facility: CLINIC | Age: 84
End: 2019-06-05

## 2019-06-05 DIAGNOSIS — I10 ESSENTIAL HYPERTENSION: ICD-10-CM

## 2019-06-05 RX ORDER — LEVETIRACETAM 100 MG/ML
10 SOLUTION ORAL 2 TIMES DAILY
Qty: 473 ML | Refills: 1 | Status: SHIPPED | OUTPATIENT
Start: 2019-06-05 | End: 2019-06-06 | Stop reason: SDUPTHER

## 2019-06-06 DIAGNOSIS — I10 ESSENTIAL HYPERTENSION: ICD-10-CM

## 2019-06-06 RX ORDER — LEVETIRACETAM 100 MG/ML
SOLUTION ORAL
Qty: 473 ML | Refills: 1
Start: 2019-06-06 | End: 2019-06-14

## 2019-06-13 ENCOUNTER — TELEPHONE (OUTPATIENT)
Dept: FAMILY MEDICINE CLINIC | Facility: CLINIC | Age: 84
End: 2019-06-13

## 2019-06-14 DIAGNOSIS — R56.9 SEIZURE (HCC): Primary | ICD-10-CM

## 2019-06-14 RX ORDER — LEVETIRACETAM 500 MG/1
500 TABLET ORAL EVERY 12 HOURS SCHEDULED
Qty: 60 TABLET | Refills: 1 | Status: SHIPPED | OUTPATIENT
Start: 2019-06-14 | End: 2019-07-30 | Stop reason: SDUPTHER

## 2019-07-01 DIAGNOSIS — R52 PAIN: ICD-10-CM

## 2019-07-01 RX ORDER — ACETAMINOPHEN 500 MG
TABLET ORAL
Qty: 62 TABLET | Refills: 0 | Status: SHIPPED | OUTPATIENT
Start: 2019-07-01 | End: 2020-01-03

## 2019-07-08 ENCOUNTER — OFFICE VISIT (OUTPATIENT)
Dept: FAMILY MEDICINE CLINIC | Facility: CLINIC | Age: 84
End: 2019-07-08
Payer: MEDICARE

## 2019-07-08 VITALS
OXYGEN SATURATION: 94 % | HEIGHT: 59 IN | HEART RATE: 70 BPM | SYSTOLIC BLOOD PRESSURE: 158 MMHG | BODY MASS INDEX: 24.49 KG/M2 | RESPIRATION RATE: 16 BRPM | DIASTOLIC BLOOD PRESSURE: 62 MMHG | TEMPERATURE: 98.5 F

## 2019-07-08 DIAGNOSIS — F32.A DEPRESSION, UNSPECIFIED DEPRESSION TYPE: ICD-10-CM

## 2019-07-08 DIAGNOSIS — I10 ESSENTIAL HYPERTENSION: Primary | ICD-10-CM

## 2019-07-08 DIAGNOSIS — Z00.00 HEALTHCARE MAINTENANCE: ICD-10-CM

## 2019-07-08 DIAGNOSIS — K59.00 CONSTIPATION, UNSPECIFIED CONSTIPATION TYPE: ICD-10-CM

## 2019-07-08 DIAGNOSIS — R56.9 SEIZURE (HCC): ICD-10-CM

## 2019-07-08 PROCEDURE — 99214 OFFICE O/P EST MOD 30 MIN: CPT | Performed by: FAMILY MEDICINE

## 2019-07-08 PROCEDURE — G0439 PPPS, SUBSEQ VISIT: HCPCS | Performed by: FAMILY MEDICINE

## 2019-07-08 RX ORDER — MENTHOL AND ZINC OXIDE .44; 20.625 G/100G; G/100G
OINTMENT TOPICAL 2 TIMES DAILY
COMMUNITY
End: 2019-08-08 | Stop reason: SDUPTHER

## 2019-07-08 NOTE — PROGRESS NOTES
Assessment/Plan:     Diagnoses and all orders for this visit:    Essential hypertension  Comments:  Controlled  Continue metoprolol succinate 25 mg and amlodipine 5 mg tablets QD  Call the office if pt experiences palpitations, chest pain, diaphoresis, SOB    Depression, unspecified depression type  Comments:  Pt improved on Zoloft without side effects  Continue Zoloft 50 mg tablets QD  Constipation, unspecified constipation type  Comments:  Pt's daughter reports that they monitor her BMs  Continue prescribed laxatives and stool softeners PRN constipation  Seizure ([address] )  Comments:  Continue Keppra 500 mg tablets BID    Healthcare maintenance  Comments: It was discussed about immunization, diet, exercise and safety measures  Other orders  -     menthol-zinc oxide (RISAMINE) 0 44-20 625 %; Apply topically 2 (two) times a day Apply to buttocks prn for irritation *may keep at bedside        Return to office in 3 months for follow-up  There are no Patient Instructions on file for this visit  Return in about 3 months (around 10/8/2019)  Subjective:      Patient ID: Mehdi Penny is a 80 y o  female  Chief Complaint   Patient presents with    Hypertension     3 month follow up   [facility]       Pt presents with her daughter for an annual well visit  On Saturday, pt's daughter noticed that she appeared to be congested and fatigued  Pt hasn't complained about it  Denies fevers, change in voice, cough, SOB  Pt's daughter is unsure of what pt's blood pressures have been at the nursing home  She states that the patient was agitated prior to coming to the doctor's office and feels that this may be the reason for today's high reading  Pt has not complained of chest pain, palpitations, SOB, headaches, or presyncopal events  Pt's daughter reports that the antidepressant has been effective  Her mother seems happier and more upbeat  Denies side effects of the medication        The following portions of the patient's history were reviewed and updated as appropriate: allergies, current medications, past family history, past medical history, past social history, past surgical history and problem list     Review of Systems   Constitutional: Negative  Negative for fever  HENT: Positive for congestion  Negative for sneezing and voice change  Eyes: Negative  Respiratory: Negative  Cardiovascular: Negative  Gastrointestinal: Negative  Endocrine: Negative  Genitourinary: Negative  Musculoskeletal: Positive for arthralgias  Neurological: Negative  Hematological: Negative  Psychiatric/Behavioral: Positive for agitation           Current Outpatient Medications   Medication Sig Dispense Refill    acetaminophen (ACETAMINOPHEN EXTRA STRENGTH) 500 mg tablet TAKE 1 TABLET BY MOUTH TWICE DAILY AT 8A-8P (PAIN) 62 tablet 0    amLODIPine (NORVASC) 5 mg tablet Take 1 tablet (5 mg total) by mouth daily 90 tablet 3    ascorbic acid (VITAMIN C) 500 mg tablet Take 500 mg by mouth daily      aspirin 81 mg chewable tablet Chew 1 tablet (81 mg total) daily 100 tablet 3    Calcium Carbonate-Vitamin D 600-400 MG-UNIT per tablet TAKE 1 TABLET BY MOUTH DAILY @ 8A (SUPPLEMENT) 30 tablet 12    docusate sodium (COLACE) 100 mg capsule Take 100 mg by mouth      levETIRAcetam (KEPPRA) 500 mg tablet Take 1 tablet (500 mg total) by mouth every 12 (twelve) hours 60 tablet 1    Magnesium Hydroxide (MILK OF MAGNESIA PO) Take 30 mL by mouth daily as needed      menthol-zinc oxide (RISAMINE) 0 44-20 625 % Apply topically 2 (two) times a day Apply to buttocks prn for irritation *may keep at bedside      metoprolol succinate (TOPROL-XL) 25 mg 24 hr tablet Take 25 mg by mouth daily Take one half tablet by mouth daily at 8 am * hold if SBP <100 or HR <60      POLYETHYLENE GLYCOL 3350 PO Take 1 g by mouth daily      senna (SENOKOT) 8 6 MG tablet Take 1 tablet by mouth daily as needed for constipation  sertraline (ZOLOFT) 50 mg tablet Take 1 tablet (50 mg total) by mouth every morning 30 tablet 5    Sodium Phosphates (ENEMA READY-TO-USE RE) Insert into the rectum daily as needed      Starch-Maltodextrin (THICK-IT PO) Take by mouth      THERA (THERA/BETA-CAROTENE) TAKE 1 TABLET BY MOUTH DAILY @ 8A (SUPPLEMENT) 30 tablet 12    zolpidem (AMBIEN) 5 mg tablet Take 1 tablet (5 mg total) by mouth daily at bedtime as needed for sleep 30 tablet 3    bisacodyl (BISCOLAX) 10 mg suppository Insert 10 mg into the rectum daily as needed for constipation      Multiple Vitamins-Minerals (MULTIVITAMIN ADULT) TABS Take 1 tablet by mouth every 24 hours (Patient not taking: Reported on 7/8/2019) 100 tablet 3     No current facility-administered medications for this visit  Objective:    /62 (BP Location: Left arm, Patient Position: Sitting, Cuff Size: Adult)   Pulse 70   Temp 98 5 °F (36 9 °C) (Tympanic)   Resp 16   Ht 4' 11" (1 499 m)   SpO2 94%   BMI 24 49 kg/m²        Physical Exam   Constitutional: She is oriented to person, place, and time  She appears well-developed and well-nourished  HENT:   Head: Normocephalic and atraumatic  Right Ear: External ear normal    Left Ear: External ear normal    Eyes: Pupils are equal, round, and reactive to light  Conjunctivae and EOM are normal    Neck: Normal range of motion  Neck supple  Cardiovascular: Normal rate, regular rhythm, normal heart sounds and intact distal pulses  Pulmonary/Chest: Effort normal and breath sounds normal    Abdominal: Soft  Bowel sounds are normal    Musculoskeletal: Normal range of motion  She exhibits no edema  Lymphadenopathy:     She has no cervical adenopathy  Neurological: She is alert and oriented to person, place, and time  No cranial nerve deficit  Skin: Skin is warm and dry  Psychiatric: She has a normal mood and affect  Nursing note and vitals reviewed               Ashkan Coronado MD
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
## 2019-07-23 ENCOUNTER — TELEPHONE (OUTPATIENT)
Dept: FAMILY MEDICINE CLINIC | Facility: CLINIC | Age: 84
End: 2019-07-23

## 2019-07-23 DIAGNOSIS — R30.0 DYSURIA: Primary | ICD-10-CM

## 2019-07-23 NOTE — TELEPHONE ENCOUNTER
Received fax from [facility] stating pt is having frequent urination and is very confused  requesting UA order  Order faxed    Discussed with Raymon Hudson

## 2019-07-24 ENCOUNTER — APPOINTMENT (OUTPATIENT)
Dept: LAB | Facility: IMAGING CENTER | Age: 84
End: 2019-07-24
Payer: MEDICARE

## 2019-07-24 DIAGNOSIS — R30.0 DYSURIA: ICD-10-CM

## 2019-07-24 LAB
BACTERIA UR QL AUTO: ABNORMAL /HPF
BILIRUB UR QL STRIP: NEGATIVE
CLARITY UR: ABNORMAL
COLOR UR: YELLOW
GLUCOSE UR STRIP-MCNC: NEGATIVE MG/DL
HGB UR QL STRIP.AUTO: ABNORMAL
KETONES UR STRIP-MCNC: NEGATIVE MG/DL
LEUKOCYTE ESTERASE UR QL STRIP: ABNORMAL
NITRITE UR QL STRIP: NEGATIVE
NON-SQ EPI CELLS URNS QL MICRO: ABNORMAL /HPF
PH UR STRIP.AUTO: 7.5 [PH]
PROT UR STRIP-MCNC: ABNORMAL MG/DL
RBC #/AREA URNS AUTO: ABNORMAL /HPF
SP GR UR STRIP.AUTO: 1.01 (ref 1–1.03)
UROBILINOGEN UR QL STRIP.AUTO: 0.2 E.U./DL
WBC #/AREA URNS AUTO: ABNORMAL /HPF

## 2019-07-24 PROCEDURE — 87086 URINE CULTURE/COLONY COUNT: CPT

## 2019-07-24 PROCEDURE — 81001 URINALYSIS AUTO W/SCOPE: CPT

## 2019-07-26 DIAGNOSIS — N30.00 ACUTE CYSTITIS WITHOUT HEMATURIA: Primary | ICD-10-CM

## 2019-07-26 LAB
BACTERIA UR CULT: NORMAL
BACTERIA UR CULT: NORMAL

## 2019-07-26 RX ORDER — NITROFURANTOIN 25; 75 MG/1; MG/1
100 CAPSULE ORAL 2 TIMES DAILY
Qty: 10 CAPSULE | Refills: 0 | Status: SHIPPED | OUTPATIENT
Start: 2019-07-26 | End: 2019-07-31

## 2019-07-27 LAB — BACTERIA UR CULT: ABNORMAL

## 2019-07-30 DIAGNOSIS — R56.9 SEIZURE (HCC): ICD-10-CM

## 2019-08-01 RX ORDER — LEVETIRACETAM 500 MG/1
500 TABLET ORAL EVERY 12 HOURS SCHEDULED
Qty: 60 TABLET | Refills: 2 | Status: SHIPPED | OUTPATIENT
Start: 2019-08-01 | End: 2020-01-03

## 2019-08-08 DIAGNOSIS — R23.8 SKIN IRRITATION: Primary | ICD-10-CM

## 2019-08-12 RX ORDER — MENTHOL AND ZINC OXIDE .44; 20.625 G/100G; G/100G
OINTMENT TOPICAL 2 TIMES DAILY
Qty: 1 TUBE | Refills: 1 | Status: SHIPPED | OUTPATIENT
Start: 2019-08-12

## 2019-10-08 ENCOUNTER — OFFICE VISIT (OUTPATIENT)
Dept: FAMILY MEDICINE CLINIC | Facility: CLINIC | Age: 84
End: 2019-10-08
Payer: MEDICARE

## 2019-10-08 VITALS
OXYGEN SATURATION: 92 % | RESPIRATION RATE: 16 BRPM | WEIGHT: 120 LBS | SYSTOLIC BLOOD PRESSURE: 132 MMHG | TEMPERATURE: 97.8 F | HEART RATE: 69 BPM | HEIGHT: 59 IN | BODY MASS INDEX: 24.19 KG/M2 | DIASTOLIC BLOOD PRESSURE: 82 MMHG

## 2019-10-08 DIAGNOSIS — Z23 IMMUNIZATION DUE: ICD-10-CM

## 2019-10-08 DIAGNOSIS — Z23 NEED FOR INFLUENZA VACCINATION: ICD-10-CM

## 2019-10-08 DIAGNOSIS — H61.23 BILATERAL IMPACTED CERUMEN: ICD-10-CM

## 2019-10-08 DIAGNOSIS — E78.49 OTHER HYPERLIPIDEMIA: ICD-10-CM

## 2019-10-08 DIAGNOSIS — I10 ESSENTIAL HYPERTENSION: ICD-10-CM

## 2019-10-08 DIAGNOSIS — F32.89 OTHER DEPRESSION: Primary | ICD-10-CM

## 2019-10-08 PROCEDURE — 99214 OFFICE O/P EST MOD 30 MIN: CPT | Performed by: FAMILY MEDICINE

## 2019-10-08 PROCEDURE — G0008 ADMIN INFLUENZA VIRUS VAC: HCPCS

## 2019-10-08 PROCEDURE — 90662 IIV NO PRSV INCREASED AG IM: CPT

## 2019-10-08 PROCEDURE — 69210 REMOVE IMPACTED EAR WAX UNI: CPT | Performed by: FAMILY MEDICINE

## 2019-10-08 NOTE — PROGRESS NOTES
Assessment/Plan:  No problem-specific Assessment & Plan notes found for this encounter  Diagnoses and all orders for this visit:    Other depression    Other hyperlipidemia    Essential hypertension    Bilateral impacted cerumen    Immunization due  -     influenza vaccine, 1730-4990, high-dose, PF 0 5 mL (FLUZONE HIGH-DOSE)    Need for influenza vaccination    Other orders  -     menthol-zinc oxide (CALMOSEPTINE) 0 44-20 6 % OINT; Apply topically 2 (two) times a day  -     Ear cerumen removal          There are no Patient Instructions on file for this visit  Return in about 3 months (around 1/8/2020)  Subjective:      Patient ID: Jesenia Luciano is a 80 y o  female  Chief Complaint   Patient presents with    Follow-up     HTN       She is here today for follow-up multiple medical problems  She has been taking her medications  She denies any side effects from her medications  She has problem with dementia  Her daughter does not have any concerns  Patient resides at [address] [place] [address]  Staff has no concerns  The following portions of the patient's history were reviewed and updated as appropriate: allergies, current medications, past family history, past medical history, past social history, past surgical history and problem list     Review of Systems   Constitutional: Negative for chills and fever  HENT: Negative for trouble swallowing  Eyes: Negative for visual disturbance  Respiratory: Negative for cough and shortness of breath  Cardiovascular: Negative for chest pain, palpitations and leg swelling  Gastrointestinal: Negative for abdominal pain, constipation and diarrhea  Endocrine: Negative for cold intolerance and heat intolerance  Genitourinary: Negative for difficulty urinating and dysuria  Musculoskeletal: Negative for gait problem  Skin: Negative for rash  Neurological: Negative for dizziness, tremors, seizures and headaches     Hematological: Negative for adenopathy  Psychiatric/Behavioral: Negative for behavioral problems           Current Outpatient Medications   Medication Sig Dispense Refill    acetaminophen (ACETAMINOPHEN EXTRA STRENGTH) 500 mg tablet TAKE 1 TABLET BY MOUTH TWICE DAILY AT 8A-8P (PAIN) 62 tablet 0    amLODIPine (NORVASC) 5 mg tablet Take 1 tablet (5 mg total) by mouth daily 90 tablet 3    ascorbic acid (VITAMIN C) 500 mg tablet Take 500 mg by mouth daily      aspirin 81 mg chewable tablet Chew 1 tablet (81 mg total) daily 100 tablet 3    Calcium Carbonate-Vitamin D 600-400 MG-UNIT per tablet TAKE 1 TABLET BY MOUTH DAILY @ 8A (SUPPLEMENT) 30 tablet 12    levETIRAcetam (KEPPRA) 500 mg tablet Take 1 tablet (500 mg total) by mouth every 12 (twelve) hours 60 tablet 2    menthol-zinc oxide (CALMOSEPTINE) 0 44-20 6 % OINT Apply topically 2 (two) times a day      metoprolol succinate (TOPROL-XL) 25 mg 24 hr tablet Take 25 mg by mouth daily Take one half tablet by mouth daily at 8 am * hold if SBP <100 or HR <60      POLYETHYLENE GLYCOL 3350 PO Take 1 g by mouth daily      senna (SENOKOT) 8 6 MG tablet Take 1 tablet by mouth daily as needed for constipation      sertraline (ZOLOFT) 50 mg tablet Take 1 tablet (50 mg total) by mouth every morning 30 tablet 5    Starch-Maltodextrin (THICK-IT PO) Take by mouth      THERA (THERA/BETA-CAROTENE) TAKE 1 TABLET BY MOUTH DAILY @ 8A (SUPPLEMENT) 30 tablet 12    zolpidem (AMBIEN) 5 mg tablet Take 1 tablet (5 mg total) by mouth daily at bedtime as needed for sleep 30 tablet 3    bisacodyl (BISCOLAX) 10 mg suppository Insert 10 mg into the rectum daily as needed for constipation      docusate sodium (COLACE) 100 mg capsule Take 100 mg by mouth      Magnesium Hydroxide (MILK OF MAGNESIA PO) Take 30 mL by mouth daily as needed      menthol-zinc oxide (RISAMINE) 0 44-20 625 % Apply topically 2 (two) times a day Apply to buttocks prn for irritation *may keep at bedside (Patient not taking: Reported on 10/8/2019) 1 Tube 1    Multiple Vitamins-Minerals (MULTIVITAMIN ADULT) TABS Take 1 tablet by mouth every 24 hours (Patient not taking: Reported on 7/8/2019) 100 tablet 3    Sodium Phosphates (ENEMA READY-TO-USE RE) Insert into the rectum daily as needed       No current facility-administered medications for this visit  Objective:    /82 (BP Location: Left arm, Patient Position: Sitting, Cuff Size: Adult)   Pulse 69   Temp 97 8 °F (36 6 °C) (Tympanic)   Resp 16   Ht 4' 11" (1 499 m)   Wt 54 4 kg (120 lb) Comment: aprox weight pt had hard time standing  SpO2 92%   BMI 24 24 kg/m²        Physical Exam   Constitutional: She appears well-developed and well-nourished  HENT:   Head: Normocephalic and atraumatic  Ears:    Eyes: Pupils are equal, round, and reactive to light  EOM are normal    Neck: Normal range of motion  Neck supple  Cardiovascular: Normal rate, regular rhythm and normal heart sounds  Pulmonary/Chest: Effort normal and breath sounds normal    Abdominal: Soft  Bowel sounds are normal    Musculoskeletal: Normal range of motion  She exhibits no edema  Lymphadenopathy:     She has no cervical adenopathy  Neurological: She is alert  No cranial nerve deficit  Skin: Skin is warm  Psychiatric: She has a normal mood and affect  Nursing note and vitals reviewed  Ear cerumen removal  Date/Time: 10/8/2019 11:58 AM  Performed by: Ashleigh Olivera MD  Authorized by:  Ashleigh Olivera MD     Patient location:  Clinic  Other Assisting Provider: No    Consent:     Consent obtained:  Verbal    Consent given by:  Patient    Risks discussed:  Bleeding and dizziness    Alternatives discussed:  No treatment  Universal protocol:     Procedure explained and questions answered to patient or proxy's satisfaction: yes      Patient identity confirmed:  Verbally with patient  Procedure details:     Local anesthetic:  None    Location:  R ear and L ear    Procedure type: curette      Approach:  External  Post-procedure details:     Complication:  None    Hearing quality:  Improved    Patient tolerance of procedure:   Tolerated well, no immediate complications        Dondra Councilman, MD

## 2019-11-19 ENCOUNTER — TELEPHONE (OUTPATIENT)
Dept: FAMILY MEDICINE CLINIC | Facility: CLINIC | Age: 84
End: 2019-11-19

## 2019-11-19 DIAGNOSIS — K64.9 HEMORRHOIDS, UNSPECIFIED HEMORRHOID TYPE: Primary | ICD-10-CM

## 2019-11-19 NOTE — TELEPHONE ENCOUNTER
Received fax from [facility] stating pt's family is requesting Preparation H ointment for hemorrhoids  Rx request sent to provider to approve

## 2020-01-02 ENCOUNTER — TELEPHONE (OUTPATIENT)
Dept: FAMILY MEDICINE CLINIC | Facility: CLINIC | Age: 85
End: 2020-01-02

## 2020-01-02 DIAGNOSIS — R09.81 NASAL CONGESTION: Primary | ICD-10-CM

## 2020-01-02 RX ORDER — FLUTICASONE PROPIONATE 50 MCG
2 SPRAY, SUSPENSION (ML) NASAL DAILY
Qty: 16 G | Refills: 0 | Status: SHIPPED | OUTPATIENT
Start: 2020-01-02

## 2020-01-02 NOTE — TELEPHONE ENCOUNTER
Call from [facility] daughter asking if we can order something for her mother who has a lot of congestion  There are no meds ordered for this

## 2020-01-03 DIAGNOSIS — F32.89 OTHER DEPRESSION: ICD-10-CM

## 2020-01-03 DIAGNOSIS — G47.00 INSOMNIA, UNSPECIFIED TYPE: ICD-10-CM

## 2020-01-03 DIAGNOSIS — R52 PAIN: ICD-10-CM

## 2020-01-03 DIAGNOSIS — I10 ESSENTIAL HYPERTENSION: ICD-10-CM

## 2020-01-03 DIAGNOSIS — R56.9 SEIZURE (HCC): ICD-10-CM

## 2020-01-03 RX ORDER — AMLODIPINE BESYLATE 5 MG/1
TABLET ORAL
Qty: 31 TABLET | Refills: 0 | Status: SHIPPED | OUTPATIENT
Start: 2020-01-03 | End: 2020-06-11 | Stop reason: SDUPTHER

## 2020-01-03 RX ORDER — ZOLPIDEM TARTRATE 5 MG/1
5 TABLET ORAL
Qty: 30 TABLET | Refills: 3 | Status: SHIPPED | OUTPATIENT
Start: 2020-01-03

## 2020-01-03 RX ORDER — ACETAMINOPHEN 500 MG
TABLET ORAL
Qty: 62 TABLET | Refills: 0 | Status: SHIPPED | OUTPATIENT
Start: 2020-01-03

## 2020-01-03 RX ORDER — LEVETIRACETAM 500 MG/1
TABLET ORAL
Qty: 62 TABLET | Refills: 0 | Status: SHIPPED | OUTPATIENT
Start: 2020-01-03

## 2020-01-03 RX ORDER — METOPROLOL SUCCINATE 25 MG/1
TABLET, EXTENDED RELEASE ORAL
Qty: 16 TABLET | Refills: 0 | Status: SHIPPED | OUTPATIENT
Start: 2020-01-03

## 2020-01-03 RX ORDER — MULTIVITAMIN,THERAPEUTIC
TABLET ORAL
Qty: 31 TABLET | Refills: 0 | Status: SHIPPED | OUTPATIENT
Start: 2020-01-03

## 2020-01-03 RX ORDER — ASPIRIN 81 MG/1
TABLET ORAL
Qty: 31 TABLET | Refills: 0 | Status: SHIPPED | OUTPATIENT
Start: 2020-01-03 | End: 2020-01-17

## 2020-01-03 RX ORDER — ASCORBIC ACID 500 MG
TABLET ORAL
Qty: 31 TABLET | Refills: 0 | Status: SHIPPED | OUTPATIENT
Start: 2020-01-03

## 2020-01-03 NOTE — TELEPHONE ENCOUNTER
Last seen 10/8/19    Julieth lam     Linked Records  Name  ID Gender Address   Rosa Min 1926 1 female [address] [place] [address]   Report Criteria  First NameJulieth  Last Namecarole  DOB1926  Summary      Summary  Total Prescriptions   12   Total Private Pay   1   Total Prescribers   2   Total Pharmacies   1    Opioids* (excluding buprenorphine)  Current Qty   0 0   Current MME/day   0 0   30 Day Avg MME/day   0 0    Buprenorphine*  Current Qty   0 0   Current mg/day   0 0   30 Day Avg mg/day   0 0    Prescriptions    Filled  ID  Written  Drug  QTY  Days  Prescriber  Rx #  Pharmacy *  Refills  Daily Dose  Pymt Type      2019  1  2019  ZOLPIDEM TARTRATE 5 MG TABLET  31 0  31  [place] [facility]  [id number]  [facility] (2603)  5   [place]    2019  1  2019  ZOLPIDEM TARTRATE 5 MG TABLET  31 0  31  [place] [facility]  [id number]  [facility] (2603)  4   [place]    10/23/2019  1  2019  ZOLPIDEM TARTRATE 5 MG TABLET  30 0  30  [place] [facility]  [id number]  [facility] (2603)  3   [place]    2019  1  2019  ZOLPIDEM TARTRATE 5 MG TABLET  31 0  31  [place] [facility]  [id number]  [facility] (2603)  2   [place]    2019  1  2019  ZOLPIDEM TARTRATE 5 MG TABLET  30 0  30  [facility]  [id number]  [facility] (2603)  1   [place]    2019  1  2019  ZOLPIDEM TARTRATE 5 MG TABLET  31 0  31  [place] [facility]  [id number]  [facility] (2603)  0   [place]    2019  1  2019  ZOLPIDEM TARTRATE 5 MG TABLET  31 0  31  [place] [facility]  [id number]  [facility] (2603)  1   [place]    2019  1  2019  ZOLPIDEM TARTRATE 5 MG TABLET  30 0  30  [place] [facility]  [id number]  [facility] (2603)  0   [place]    2019  1  2019  ZOLPIDEM TARTRATE 5 MG TABLET  27 0  27  [place] [facility]  [id number]  [facility] (2603)  0   [place]    2019  1  2019  ZOLPIDEM TARTRATE 5 MG TABLET  30 0  30  [place] [facility]  [id number]  formerly [facility] (2600)  0   [place]    2019  1  2019  ZOLPIDEM TARTRATE 5 MG TABLET  30 0 30  GE PRO  [id number]  [facility] (2603)  0   [place]    03/07/2019  1  03/07/2019  ZOLPIDEM TARTRATE 5 MG TABLET  30 0  30  GE PRO  [id number]  [facility] (2603)  0   [place]      From [facility] Website

## 2020-01-17 ENCOUNTER — OFFICE VISIT (OUTPATIENT)
Dept: FAMILY MEDICINE CLINIC | Facility: CLINIC | Age: 85
End: 2020-01-17
Payer: MEDICARE

## 2020-01-17 VITALS
HEART RATE: 70 BPM | RESPIRATION RATE: 16 BRPM | OXYGEN SATURATION: 93 % | TEMPERATURE: 98.7 F | DIASTOLIC BLOOD PRESSURE: 78 MMHG | SYSTOLIC BLOOD PRESSURE: 138 MMHG | WEIGHT: 130 LBS | HEIGHT: 59 IN | BODY MASS INDEX: 26.21 KG/M2

## 2020-01-17 DIAGNOSIS — F32.89 OTHER DEPRESSION: ICD-10-CM

## 2020-01-17 DIAGNOSIS — I10 ESSENTIAL HYPERTENSION: ICD-10-CM

## 2020-01-17 DIAGNOSIS — R45.1 AGITATION: ICD-10-CM

## 2020-01-17 DIAGNOSIS — J06.9 VIRAL UPPER RESPIRATORY TRACT INFECTION: Primary | ICD-10-CM

## 2020-01-17 PROCEDURE — 99214 OFFICE O/P EST MOD 30 MIN: CPT | Performed by: FAMILY MEDICINE

## 2020-01-17 RX ORDER — LANOLIN ALCOHOL/MO/W.PET/CERES
3 CREAM (GRAM) TOPICAL
COMMUNITY

## 2020-01-17 RX ORDER — QUETIAPINE FUMARATE 25 MG/1
12.5 TABLET, FILM COATED ORAL
Qty: 30 TABLET | Refills: 3 | Status: SHIPPED | OUTPATIENT
Start: 2020-01-17 | End: 2020-01-22 | Stop reason: SDUPTHER

## 2020-01-17 NOTE — PROGRESS NOTES
Assessment/Plan:    Viral upper respiratory tract infection  Improving continue with encouraging oral hydration  Agitation  I am going to start her on Seroquel 25 mg 1 tablet twice a day  Continue to follow up with hospice  Depression  I am going to stop Zoloft and start Seroquel 25 mg half tablet twice a day  Hypertension  Stable  Continue to monitor  Diagnoses and all orders for this visit:    Viral upper respiratory tract infection    Agitation  -     QUEtiapine (SEROquel) 25 mg tablet; Take 0 5 tablets (12 5 mg total) by mouth daily at bedtime    BMI 26 0-26 9,adult    Other depression    Essential hypertension    Other orders  -     melatonin 3 mg; Take 3 mg by mouth daily at bedtime          Subjective:      Patient ID: Capri Thompson is a 80 y o  female  Patient here today for follow-up for recent hospitalization for URI  Patient complains of some SOB  Patient denies congestion, rhinorrhea, cough, fever  As per daughter, patient was not herself in the hospital  Patient was argumentative and refusing treatment and food  Since transition back into her assisted living facility, patient has become more herself, as per daughter  The following portions of the patient's history were reviewed and updated as appropriate: allergies, current medications, past family history, past medical history, past social history, past surgical history and problem list     Review of Systems   Constitutional: Negative for chills and fever  HENT: Negative for congestion, drooling, postnasal drip and rhinorrhea  Respiratory: Positive for shortness of breath  Negative for cough  Psychiatric/Behavioral: Positive for agitation and confusion  Objective:               Physical Exam   Constitutional: She is oriented to person, place, and time  She appears well-developed and well-nourished  HENT:   Head: Normocephalic and atraumatic     Right Ear: External ear normal    Left Ear: External ear normal    Eyes: Pupils are equal, round, and reactive to light  Conjunctivae and EOM are normal    Neck: Normal range of motion  Neck supple  Cardiovascular: Normal rate, regular rhythm and normal heart sounds  Pulmonary/Chest: Effort normal and breath sounds normal    Abdominal: Soft  Bowel sounds are normal  She exhibits no distension  There is no tenderness  Musculoskeletal: Normal range of motion  She exhibits no edema  Lymphadenopathy:     She has no cervical adenopathy  Neurological: She is alert and oriented to person, place, and time  No cranial nerve deficit  Skin: Skin is warm and dry  Psychiatric: She has a normal mood and affect  Nursing note and vitals reviewed  BMI Counseling: Body mass index is 26 26 kg/m²  The BMI is above normal  No BMI follow-up plan is appropriate  Patient is currently receiving palliative care

## 2020-01-21 ENCOUNTER — TELEPHONE (OUTPATIENT)
Dept: FAMILY MEDICINE CLINIC | Facility: CLINIC | Age: 85
End: 2020-01-21

## 2020-01-21 NOTE — TELEPHONE ENCOUNTER
Call from [facility] stating they received two scripts for seroquel 25mg    Dr Jaden Spencer sent qd and they received a written script from [facility] for bid 2017 17:20

## 2020-01-22 DIAGNOSIS — R45.1 AGITATION: ICD-10-CM

## 2020-01-22 RX ORDER — QUETIAPINE FUMARATE 25 MG/1
12.5 TABLET, FILM COATED ORAL 2 TIMES DAILY
Qty: 30 TABLET | Refills: 3 | Status: SHIPPED | OUTPATIENT
Start: 2020-01-22 | End: 2020-03-18

## 2020-01-22 NOTE — TELEPHONE ENCOUNTER
Seroquel was changed to 25 mg 1/2 tab twice a day   I re-sent  Script to [facility]

## 2020-01-22 NOTE — TELEPHONE ENCOUNTER
Can you please review and let us know what dose she should be on   I looked at note from the 16 th and I was unsure

## 2020-02-13 DIAGNOSIS — L30.9 DERMATITIS: Primary | ICD-10-CM

## 2020-02-14 RX ORDER — CLOTRIMAZOLE 1 G/100G
CREAM TOPICAL
Qty: 28.4 G | Refills: 0 | Status: SHIPPED | OUTPATIENT
Start: 2020-02-14

## 2020-03-16 DIAGNOSIS — R45.1 AGITATION: ICD-10-CM

## 2020-03-18 RX ORDER — QUETIAPINE FUMARATE 25 MG/1
TABLET, FILM COATED ORAL
Qty: 15 TABLET | Refills: 0 | Status: SHIPPED | OUTPATIENT
Start: 2020-03-18 | End: 2020-05-15

## 2020-05-13 DIAGNOSIS — R45.1 AGITATION: ICD-10-CM

## 2020-05-15 RX ORDER — QUETIAPINE FUMARATE 25 MG/1
TABLET, FILM COATED ORAL
Qty: 15 TABLET | Refills: 0 | Status: SHIPPED | OUTPATIENT
Start: 2020-05-15 | End: 2020-06-11 | Stop reason: SDUPTHER

## 2020-06-11 ENCOUNTER — TELEPHONE (OUTPATIENT)
Dept: FAMILY MEDICINE CLINIC | Facility: CLINIC | Age: 85
End: 2020-06-11

## 2020-06-11 DIAGNOSIS — R45.1 AGITATION: ICD-10-CM

## 2020-06-11 DIAGNOSIS — I10 ESSENTIAL HYPERTENSION: ICD-10-CM

## 2020-06-11 RX ORDER — QUETIAPINE FUMARATE 25 MG/1
TABLET, FILM COATED ORAL
Qty: 15 TABLET | Refills: 0 | Status: SHIPPED | OUTPATIENT
Start: 2020-06-11

## 2020-06-11 RX ORDER — AMLODIPINE BESYLATE 5 MG/1
5 TABLET ORAL DAILY
Qty: 31 TABLET | Refills: 3 | Status: SHIPPED | OUTPATIENT
Start: 2020-06-11